# Patient Record
Sex: FEMALE | Race: WHITE
[De-identification: names, ages, dates, MRNs, and addresses within clinical notes are randomized per-mention and may not be internally consistent; named-entity substitution may affect disease eponyms.]

---

## 2017-04-19 ENCOUNTER — HOSPITAL ENCOUNTER (OUTPATIENT)
Dept: HOSPITAL 62 - WI | Age: 52
End: 2017-04-19
Attending: FAMILY MEDICINE
Payer: MEDICARE

## 2017-04-19 DIAGNOSIS — Z12.31: Primary | ICD-10-CM

## 2017-04-19 PROCEDURE — 77067 SCR MAMMO BI INCL CAD: CPT

## 2017-04-19 PROCEDURE — G0202 SCR MAMMO BI INCL CAD: HCPCS

## 2018-01-13 ENCOUNTER — HOSPITAL ENCOUNTER (EMERGENCY)
Dept: HOSPITAL 62 - ER | Age: 53
Discharge: HOME | End: 2018-01-13
Payer: MEDICARE

## 2018-01-13 VITALS — DIASTOLIC BLOOD PRESSURE: 87 MMHG | SYSTOLIC BLOOD PRESSURE: 107 MMHG

## 2018-01-13 DIAGNOSIS — J44.9: ICD-10-CM

## 2018-01-13 DIAGNOSIS — F17.200: ICD-10-CM

## 2018-01-13 DIAGNOSIS — E11.9: ICD-10-CM

## 2018-01-13 DIAGNOSIS — R05: Primary | ICD-10-CM

## 2018-01-13 DIAGNOSIS — Z90.710: ICD-10-CM

## 2018-01-13 DIAGNOSIS — Z90.49: ICD-10-CM

## 2018-01-13 PROCEDURE — 99283 EMERGENCY DEPT VISIT LOW MDM: CPT

## 2018-01-13 NOTE — ER DOCUMENT REPORT
ED General





- General


Chief Complaint: Cough


Stated Complaint: COUGH


Time Seen by Provider: 01/13/18 08:28


TRAVEL OUTSIDE OF THE [place] IN LAST 30 DAYS: No





- HPI


Patient complains to provider of: Cough


Notes: 





Patient coming in for evaluation of cough.  Patient states cough ongoing for 

approximately 1-2 weeks.  Patient does have history of smoking patient patient 

she has been sick she is not smoking patient has guarding on 3 1 Z-Neo and a 

course of steroids patient states she is on her second Z-Neo at this time.  

Denies fevers chills.  Denies any recent travel.  Patient resting comfortably 

upon my evaluation.





- Related Data


Allergies/Adverse Reactions: 


 





amoxicillin [From Augmentin] Allergy (Verified 09/20/16 16:48)


 


clarithromycin [From Biaxin] Allergy (Verified 09/20/16 16:48)


 


clavulanic acid [From Augmentin] Allergy (Verified 09/20/16 16:48)


 


meloxicam [From Mobic] Allergy (Verified 09/20/16 16:48)


 











Past Medical History





- Social History


Smoking Status: Current Every Day Smoker


Chew tobacco use (# tins/day): No


Frequency of alcohol use: None


Drug Abuse: None


Family History: Reviewed & Not Pertinent


Patient has suicidal ideation: No


Patient has homicidal ideation: No


Pulmonary Medical History: Reports: Hx Bronchitis, Hx COPD


Endocrine Medical History: Reports: Hx Diabetes Mellitus Type 2


Renal/ Medical History: Denies: Hx Peritoneal Dialysis


GI Medical History: Reports: Hx Gastroesophageal Reflux Disease


Past Surgical History: Reports: Hx Breast Surgery - breast reduction, Hx 

Cholecystectomy, Hx Genitourinary Surgery - bladder mesh insert and removal, Hx 

Gynecologic Surgery - hysterectomy, Hx Hysterectomy, Hx Nose Surgery - deviated 

septum, polyp removals, Hx Orthopedic Surgery - right shoulder spurs and cyst 

removal, torn rotator cuff, left ulnar nerve, Hx Thyroid Surgery - thyroidectomy





- Immunizations


Immunizations up to date: No


Hx Diphtheria, Pertussis, Tetanus Vaccination: No





Review of Systems





- Review of Systems


Constitutional: No symptoms reported


EENT: No symptoms reported


Cardiovascular: No symptoms reported


Respiratory: Cough


Gastrointestinal: No symptoms reported


Genitourinary: No symptoms reported


Female Genitourinary: No symptoms reported


Musculoskeletal: No symptoms reported


Skin: No symptoms reported


Hematologic/Lymphatic: No symptoms reported


Neurological/Psychological: No symptoms reported


-: Yes All other systems reviewed and negative





Physical Exam





- Vital signs


Vitals: 





 











Temp Pulse Resp BP Pulse Ox


 


 98.3 F   70   16   107/87 H  97 


 


 01/13/18 08:03  01/13/18 08:03  01/13/18 08:03  01/13/18 08:03  01/13/18 08:03











Interpretation: Normal





- General


General appearance: Appears well, Alert





- HEENT


Head: Normocephalic, Atraumatic


Eyes: Normal


Pupils: PERRL





- Respiratory


Respiratory status: No respiratory distress


Chest status: Nontender


Breath sounds: Normal


Chest palpation: Normal





- Cardiovascular


Rhythm: Regular


Heart sounds: Normal auscultation


Murmur: No





- Abdominal


Inspection: Normal


Distension: No distension


Bowel sounds: Normal


Tenderness: Nontender


Organomegaly: No organomegaly





- Back


Back: Normal, Nontender





- Extremities


General upper extremity: Normal inspection, Nontender, Normal color, Normal ROM

, Normal temperature


General lower extremity: Normal inspection, Nontender, Normal color, Normal ROM

, Normal temperature, Normal weight bearing.  No: Ally's sign





- Neurological


Neuro grossly intact: Yes


Cognition: Normal


Orientation: AAOx4


[place] Coma Scale Eye Opening: Spontaneous


[place] Coma Scale Verbal: Oriented


[place] Coma Scale Motor: Obeys Commands


[place] Coma Scale Total: 15


Speech: Normal


Motor strength normal: LUE, RUE, LLE, RLE


Sensory: Normal





- Psychological


Associated symptoms: Normal affect, Normal mood





- Skin


Skin Temperature: Warm


Skin Moisture: Dry


Skin Color: Normal





Course





- Re-evaluation


Re-evalutation: 





01/13/18 14:11


Patient coming in for evaluation of cough.  Lung sounds are clear.  Explained 

the patient the pathophysiology of bronchitis with a long history of cough 

afterwards that the cough will take approximately 4-6 weeks to clear especially 

with the patient have a history of smoking.  Patient will be given Tylenol 3 

for cough suppression.  Educated patient on the use of honey as well.  Patient 

is also reviewed to continue albuterol at home did refill the patient's 

prescription for albuterol she states that her albuterol is out of date at this 

time.  Encouraged patient to stop smoking.  Patient will be discharged home.





- Vital Signs


Vital signs: 





 











Temp Pulse Resp BP Pulse Ox


 


 98.3 F   70   16   107/87 H  97 


 


 01/13/18 08:03  01/13/18 08:03  01/13/18 08:03  01/13/18 08:03  01/13/18 08:03














Discharge





- Discharge


Clinical Impression: 


 Cough





Condition: Good


Disposition: HOME, SELF-CARE


Instructions:  Bronchitis ([facility]), Cough Suppressant & Expectorant Medications, 

Oral Narcotic Medication ([facility]), Stop Smoking ([facility])


Additional Instructions: 


Please continue medications as previously prescribed.  He may use the Tylenol 

with codeine for possible cough suppression.  Also suggest using over-the-

counter medications mixed drinks with honey for cough suppression.  Her lung 

sounds today are clear did not suspect any other infectious etiologies far as 

pneumonia.  When people that smoke are diagnosed with bronchitis you can expect 

a cough to continue for approximately 4-6 weeks.  Stop smoking drink plenty 

water follow-up as needed.


Prescriptions: 


Acetaminophen with Codeine [Tylenol #3 Tablet] 1 each PO Q4HP PRN #20 tablet


 PRN Reason: 


Albuterol Sulfate [Albuterol Sulfate 2.5mg/3 mL] 2.5 mg IH Q4 #30 ml


Forms:  Smoking Cessation Education, Return to Work


Referrals: 


PETE MACIAS MD [Primary Care Provider] - Follow up as needed

## 2018-01-13 NOTE — ER DOCUMENT REPORT
ED General





- General


Chief Complaint: Cough


Stated Complaint: COUGH


Time Seen by Provider: 01/13/18 08:28


TRAVEL OUTSIDE OF THE [place] IN LAST 30 DAYS: No





- Related Data


Allergies/Adverse Reactions: 


 





amoxicillin [From Augmentin] Allergy (Verified 09/20/16 16:48)


 


clarithromycin [From Biaxin] Allergy (Verified 09/20/16 16:48)


 


clavulanic acid [From Augmentin] Allergy (Verified 09/20/16 16:48)


 


meloxicam [From Mobic] Allergy (Verified 09/20/16 16:48)


 











Past Medical History





- Social History


Smoking Status: Current Every Day Smoker


Chew tobacco use (# tins/day): No


Frequency of alcohol use: None


Drug Abuse: None


Family History: Reviewed & Not Pertinent


Patient has suicidal ideation: No


Patient has homicidal ideation: No


Pulmonary Medical History: Reports: Hx Bronchitis, Hx COPD


Endocrine Medical History: Reports: Hx Diabetes Mellitus Type 2


Renal/ Medical History: Denies: Hx Peritoneal Dialysis


GI Medical History: Reports: Hx Gastroesophageal Reflux Disease


Past Surgical History: Reports: Hx Breast Surgery - breast reduction, Hx 

Cholecystectomy, Hx Genitourinary Surgery - bladder mesh insert and removal, Hx 

Gynecologic Surgery - hysterectomy, Hx Hysterectomy, Hx Nose Surgery - deviated 

septum, polyp removals, Hx Orthopedic Surgery - right shoulder spurs and cyst 

removal, torn rotator cuff, left ulnar nerve, Hx Thyroid Surgery - thyroidectomy





- Immunizations


Immunizations up to date: No


Hx Diphtheria, Pertussis, Tetanus Vaccination: No





Physical Exam





- Vital signs


Vitals: 





 











Temp Pulse Resp BP Pulse Ox


 


 98.3 F   70   16   107/87 H  97 


 


 01/13/18 08:03  01/13/18 08:03  01/13/18 08:03  01/13/18 08:03  01/13/18 08:03














Course





- Vital Signs


Vital signs: 





 











Temp Pulse Resp BP Pulse Ox


 


 98.3 F   70   16   107/87 H  97 


 


 01/13/18 08:03  01/13/18 08:03  01/13/18 08:03  01/13/18 08:03  01/13/18 08:03














Discharge





- Discharge


Clinical Impression: 


 Cough





Condition: Good


Disposition: HOME, SELF-CARE


Instructions:  Bronchitis ([facility]), Cough Suppressant & Expectorant Medications, 

Oral Narcotic Medication ([facility]), Stop Smoking ([facility])


Additional Instructions: 


Please continue medications as previously prescribed.  He may use the Tylenol 

with codeine for possible cough suppression.  Also suggest using over-the-

counter medications mixed drinks with honey for cough suppression.  Her lung 

sounds today are clear did not suspect any other infectious etiologies far as 

pneumonia.  When people that smoke are diagnosed with bronchitis you can expect 

a cough to continue for approximately 4-6 weeks.  Stop smoking drink plenty 

water follow-up as needed.


Prescriptions: 


Acetaminophen with Codeine [Tylenol #3 Tablet] 1 each PO Q4HP PRN #20 tablet


 PRN Reason: 


Albuterol Sulfate [Albuterol Sulfate 2.5mg/3 mL] 2.5 mg IH Q4 #30 ml


Forms:  Smoking Cessation Education, Return to Work

## 2019-02-17 ENCOUNTER — HOSPITAL ENCOUNTER (EMERGENCY)
Dept: HOSPITAL 62 - ER | Age: 54
Discharge: HOME | End: 2019-02-17
Payer: MEDICAID

## 2019-02-17 VITALS — SYSTOLIC BLOOD PRESSURE: 133 MMHG | DIASTOLIC BLOOD PRESSURE: 79 MMHG

## 2019-02-17 DIAGNOSIS — Z87.891: ICD-10-CM

## 2019-02-17 DIAGNOSIS — J44.9: ICD-10-CM

## 2019-02-17 DIAGNOSIS — R10.9: Primary | ICD-10-CM

## 2019-02-17 DIAGNOSIS — E11.9: ICD-10-CM

## 2019-02-17 DIAGNOSIS — I10: ICD-10-CM

## 2019-02-17 DIAGNOSIS — M54.9: ICD-10-CM

## 2019-02-17 LAB
ADD MANUAL DIFF: NO
ALBUMIN SERPL-MCNC: 5.5 G/DL (ref 3.5–5)
ALP SERPL-CCNC: 127 U/L (ref 38–126)
ALT SERPL-CCNC: 27 U/L (ref 9–52)
ANION GAP SERPL CALC-SCNC: 16 MMOL/L (ref 5–19)
APPEARANCE UR: (no result)
APTT PPP: YELLOW S
AST SERPL-CCNC: 26 U/L (ref 14–36)
BASOPHILS # BLD AUTO: 0.1 10^3/UL (ref 0–0.2)
BASOPHILS NFR BLD AUTO: 0.6 % (ref 0–2)
BILIRUB DIRECT SERPL-MCNC: 0.3 MG/DL (ref 0–0.4)
BILIRUB SERPL-MCNC: 0.5 MG/DL (ref 0.2–1.3)
BILIRUB UR QL STRIP: NEGATIVE
BUN SERPL-MCNC: 12 MG/DL (ref 7–20)
CALCIUM: 11.1 MG/DL (ref 8.4–10.2)
CHLORIDE SERPL-SCNC: 101 MMOL/L (ref 98–107)
CO2 SERPL-SCNC: 29 MMOL/L (ref 22–30)
EOSINOPHIL # BLD AUTO: 0.3 10^3/UL (ref 0–0.6)
EOSINOPHIL NFR BLD AUTO: 2.8 % (ref 0–6)
ERYTHROCYTE [DISTWIDTH] IN BLOOD BY AUTOMATED COUNT: 14 % (ref 11.5–14)
GLUCOSE SERPL-MCNC: 90 MG/DL (ref 75–110)
GLUCOSE UR STRIP-MCNC: NEGATIVE MG/DL
HCT VFR BLD CALC: 49.3 % (ref 36–47)
HGB BLD-MCNC: 17.1 G/DL (ref 12–15.5)
KETONES UR STRIP-MCNC: NEGATIVE MG/DL
LIPASE SERPL-CCNC: 253.7 U/L (ref 23–300)
LYMPHOCYTES # BLD AUTO: 3.4 10^3/UL (ref 0.5–4.7)
LYMPHOCYTES NFR BLD AUTO: 37.9 % (ref 13–45)
MCH RBC QN AUTO: 30.3 PG (ref 27–33.4)
MCHC RBC AUTO-ENTMCNC: 34.8 G/DL (ref 32–36)
MCV RBC AUTO: 87 FL (ref 80–97)
MONOCYTES # BLD AUTO: 0.7 10^3/UL (ref 0.1–1.4)
MONOCYTES NFR BLD AUTO: 7.4 % (ref 3–13)
NEUTROPHILS # BLD AUTO: 4.7 10^3/UL (ref 1.7–8.2)
NEUTS SEG NFR BLD AUTO: 51.3 % (ref 42–78)
NITRITE UR QL STRIP: NEGATIVE
PH UR STRIP: 6 [PH] (ref 5–9)
PLATELET # BLD: 232 10^3/UL (ref 150–450)
POTASSIUM SERPL-SCNC: 3.9 MMOL/L (ref 3.6–5)
PROT SERPL-MCNC: 8.6 G/DL (ref 6.3–8.2)
PROT UR STRIP-MCNC: NEGATIVE MG/DL
RBC # BLD AUTO: 5.66 10^6/UL (ref 3.72–5.28)
SODIUM SERPL-SCNC: 145.9 MMOL/L (ref 137–145)
SP GR UR STRIP: 1.01
TOTAL CELLS COUNTED % (AUTO): 100 %
UROBILINOGEN UR-MCNC: NEGATIVE MG/DL (ref ?–2)
WBC # BLD AUTO: 9.1 10^3/UL (ref 4–10.5)

## 2019-02-17 PROCEDURE — 83690 ASSAY OF LIPASE: CPT

## 2019-02-17 PROCEDURE — 99284 EMERGENCY DEPT VISIT MOD MDM: CPT

## 2019-02-17 PROCEDURE — 96365 THER/PROPH/DIAG IV INF INIT: CPT

## 2019-02-17 PROCEDURE — 81001 URINALYSIS AUTO W/SCOPE: CPT

## 2019-02-17 PROCEDURE — 74177 CT ABD & PELVIS W/CONTRAST: CPT

## 2019-02-17 PROCEDURE — 96376 TX/PRO/DX INJ SAME DRUG ADON: CPT

## 2019-02-17 PROCEDURE — 96361 HYDRATE IV INFUSION ADD-ON: CPT

## 2019-02-17 PROCEDURE — 85025 COMPLETE CBC W/AUTO DIFF WBC: CPT

## 2019-02-17 PROCEDURE — 80053 COMPREHEN METABOLIC PANEL: CPT

## 2019-02-17 PROCEDURE — S0164 INJECTION PANTROPRAZOLE: HCPCS

## 2019-02-17 PROCEDURE — 96366 THER/PROPH/DIAG IV INF ADDON: CPT

## 2019-02-17 PROCEDURE — 96375 TX/PRO/DX INJ NEW DRUG ADDON: CPT

## 2019-02-17 PROCEDURE — 36415 COLL VENOUS BLD VENIPUNCTURE: CPT

## 2019-02-17 NOTE — RADIOLOGY REPORT (SQ)
EXAM DESCRIPTION:  CT ABD/PELVIS WITH IV   ORAL



COMPLETED DATE/TIME:  2/17/2019 2:01 pm



REASON FOR STUDY:  abd pain .  Diffuse abdominal pain.  Prior cholecystectomy and hysterectomy.



COMPARISON:  None.



TECHNIQUE:  CT scan of the abdomen and pelvis performed using helical scanning technique with dynamic
 intravenous contrast injection and with oral contrast. Images reviewed with lung, soft tissue, and b
one windows. Reconstructed coronal and sagittal MPR images reviewed. Delayed images for evaluation of
 the urinary system also acquired. All images stored on PACS.

All CT scanners at this facility use dose modulation, iterative reconstruction, and/or weight based d
osing when appropriate to reduce radiation dose to as low as reasonably achievable (ALARA).

[facility]: Dose Right  [facility]: CareDose    [facility]: Dose Right    [facility]: Teradose 4D    [facility]: Smart Technologies



CONTRAST TYPE AND DOSE:  contrast/concentration: Isovue 350.00 mg/ml; Total Contrast Delivered: 93.0 
ml; Total Saline Delivered: 71.0 ml



RENAL FUNCTION:  Creatinine 0.81



RADIATION DOSE:  CT Rad equipment meets quality standard of care and radiation dose reduction techniq
ues were employed. CTDIvol: 10.7 - 14.9 mGy. DLP: 1455 mGy-cm..



LIMITATIONS:  None.



FINDINGS:  LOWER CHEST: No consolidation or pleural effusion.

LIVER: Enlarged measuring 23.3 cm in craniocaudal diameter.  There is diffuse decreased attenuation, 
most consistent with fatty infiltration.  No dilated ducts.

SPLEEN: Normal size.

PANCREAS: No significant calcifications. No adjacent inflammation or peripancreatic fluid collections
. Pancreatic duct not dilated.

GALLBLADDER: Surgically absent.

ADRENAL GLANDS: No significant masses or asymmetry.

RIGHT KIDNEY AND URETER: No solid masses.   No significant calcifications.   No hydronephrosis or hyd
roureter.

LEFT KIDNEY AND URETER: No solid masses.   No significant calcifications.   No hydronephrosis or hydr
oureter.

AORTA AND VESSELS: Atherosclerotic calcifications in the abdominal aorta and its branches.  No abdomi
nal aortic aneurysm.

RETROPERITONEUM: No retroperitoneal adenopathy, hemorrhage or masses.

BOWEL AND PERITONEAL CAVITY: No dilated bowel loops or inflammatory changes. No free fluid or free ai
r.

APPENDIX: Normal.

PELVIS: Partially obscured by streak artifact from the left hip prosthesis.  The urinary bladder is p
artially distended.  The uterus is surgically absent.  No free fluid.

ABDOMINAL WALL: Status post ventral hernia repair with mesh placement.

BONES: The patient is status post total left hip arthroplasty.  Multilevel degenerative changes are s
een at the spine.



IMPRESSION:  1. No acute findings in the abdomen or pelvis.

2. Hepatomegaly.  Fatty infiltration of the liver.



TECHNICAL DOCUMENTATION:  JOB ID:  [id number]

OH-[id number]

Quality ID # 436: Final reports with documentation of one or more dose reduction techniques (e.g., Au
tomated exposure control, adjustment of the mA and/or kV according to patient size, use of iterative 
reconstruction technique)

 2011 [employer]- All Rights Reserved



Reading location - IP/workstation name: [email or web address]

## 2019-02-17 NOTE — ER DOCUMENT REPORT
ED General





- General


Chief Complaint: Abdominal Pain


Stated Complaint: BACK PAIN


Time Seen by Provider: 02/17/19 10:02


Primary Care Provider: 


PETE MACIAS MD [NO LOCAL MD] - Follow up as needed


Mode of Arrival: Ambulatory


Information source: Patient


Notes: 





53-year-old female with a history of diabetes, hypertension, GERD, 

diverticulosis who presents to the emergency room with abdominal pain, bloating,

discomfort for the past week.  She denies constipation.  She was placed on 

Augmentin by her primary care doctor 3 days ago.  She has had persistent 

symptoms.  She denies any blood in the stool.  She denies any fever.


TRAVEL OUTSIDE OF THE [place] IN LAST 30 DAYS: No





- HPI


Onset: Last week


Onset/Duration: Gradual


Quality of pain: Achy, Dull


Severity: Mild


Pain Level: 1


Associated symptoms: denies: Chest pain, Fever, Shortness of breath


Exacerbated by: Denies


Relieved by: Denies


Similar symptoms previously: Yes


Recently seen / treated by doctor: Yes





- Related Data


Allergies/Adverse Reactions: 


                                        





clarithromycin [From Biaxin] Allergy (Verified 02/17/19 09:47)


   


clavulanic acid [From Augmentin] Allergy (Verified 02/17/19 09:47)


   


meloxicam [From Mobic] Allergy (Verified 02/17/19 09:47)


   


adhesive tape Adverse Reaction (Intermediate, Verified 02/17/19 10:01)


   


amoxicillin [From Augmentin] Adverse Reaction (Verified 02/17/19 10:42)


   











Past Medical History





- General


Information source: Patient





- Social History


Smoking Status: Former Smoker


Cigarette use (# per day): No


Chew tobacco use (# tins/day): No


Frequency of alcohol use: None


Drug Abuse: None


Family History: Reviewed & Not Pertinent


Patient has suicidal ideation: No


Patient has homicidal ideation: No


Pulmonary Medical History: Reports: Hx Bronchitis, Hx COPD


Endocrine Medical History: Reports: Hx Diabetes Mellitus Type 2


Renal/ Medical History: Denies: Hx Peritoneal Dialysis


GI Medical History: Reports: Hx Gastroesophageal Reflux Disease


Past Surgical History: Reports: Hx Breast Surgery - breast reduction, Hx 

Cholecystectomy, Hx Genitourinary Surgery - bladder mesh insert and removal, Hx 

Gynecologic Surgery - hysterectomy, Hx Hysterectomy, Hx Nose Surgery - deviated 

septum, polyp removals, Hx Orthopedic Surgery - right shoulder spurs and cyst 

removal, torn rotator cuff, left ulnar nerve, Hx Thyroid Surgery - thyroidectomy





- Immunizations


Immunizations up to date: No


Hx Diphtheria, Pertussis, Tetanus Vaccination: No





Review of Systems





- Review of Systems


Constitutional: denies: Chills, Fever


EENT: No symptoms reported


Cardiovascular: No symptoms reported


Respiratory: No symptoms reported


Gastrointestinal: See HPI


Genitourinary: No symptoms reported


Female Genitourinary: No symptoms reported


Musculoskeletal: No symptoms reported


Skin: No symptoms reported


Hematologic/Lymphatic: No symptoms reported


Neurological/Psychological: No symptoms reported





Physical Exam





- Vital signs


Vitals: 


                                        











Temp Pulse Resp BP Pulse Ox


 


 97.7 F   86   18   140/81 H  98 


 


 02/17/19 09:54  02/17/19 09:54  02/17/19 09:54  02/17/19 09:54  02/17/19 09:54











Notes: 





Physical exam:


 


GENERAL: Patient is alert and oriented x3,  c/o abdominal discomfort





HEAD: Atraumatic, normocephalic.





EYES: Pupils equal round and reactive to light, extraocular movements intact, 

sclera anicteric, conjunctiva are normal.





ENT: TMs normal, nares patent, oropharynx clear without exudates.  Moist mucous 

membranes.





NECK: Normal range of motion, supple without obvious mass or JVD.





LUNGS: Breath sounds clear to auscultation bilaterally and equal.  No wheezes 

rales or rhonchi.





HEART: Regular rate and rhythm without murmurs, rubs or gallops.





ABDOMEN: Soft, normoactive bowel sounds.  Mild tenderness diffusely but seems to

be more in the left lower quadrant no guarding, no rebound.  No masses appre

ciated.





EXTREMITIES: Normal range of motion, no pitting or edema.  No clubbing or 

cyanosis.





NEUROLOGICAL: Cranial nerves II through XII grossly intact.  Normal speech, 

moving all extremities.





PSYCH: Normal mood, normal affect.





SKIN: Warm, Dry, normal turgor, no rashes or lesions noted.





Course





- Re-evaluation


Re-evalutation: 





02/17/19 19:02


Note: The CT of the abdomen shows no acute process.


On reassessment of the patient, she does state that she feels bloated and gassy.

 She is drinking a soft drink at this time.  I have advised her to avoid 

carbonated drinks for the next week or 2 and then try and cut down on the 

smoking.  I have also recommended that she continue current medicines.  She is 

on a GI prophylaxis medicine.  I will give her some medicine for abdominal 

cramping.  Given that her primary care doctor put her on Augmentin, I have 

advised her to continue this.  I have advised her to follow-up with her primary 

care doctor on Monday.  I have given her a copy of today's lab tests and CT 

results.





- Vital Signs


Vital signs: 


                                        











Temp Pulse Resp BP Pulse Ox


 


 97.8 F   71   18   133/79 H  96 


 


 02/17/19 16:10  02/17/19 16:10  02/17/19 16:10  02/17/19 16:10  02/17/19 16:10














- Laboratory


Result Diagrams: 


                                 02/17/19 10:24





                                 02/17/19 10:24


Laboratory results interpreted by me: 


                                        











  02/17/19 02/17/19 02/17/19





  10:20 10:24 10:24


 


RBC   5.66 H 


 


Hgb   17.1 H 


 


Hct   49.3 H 


 


Sodium    145.9 H


 


Calcium    11.1 H


 


Alkaline Phosphatase    127 H


 


Total Protein    8.6 H


 


Albumin    5.5 H


 


Urine Blood  SMALL H  














- Diagnostic Test


Radiology reviewed: Image reviewed, Reports reviewed - T of the abdomen shows no

acute intra-abdominal process





Discharge





- Discharge


Clinical Impression: 


 Abdominal pain





Condition: Stable


Disposition: HOME, SELF-CARE


Additional Instructions: 


As we discussed, the CAT scan did not show any acute intra-abdominal pathology.


Your labs did look good.


I would take it easy over the next few days.


Avoid carbonated drinks.


Take the medicine as prescribed.


Continue with the Augmentin as previously prescribed.


Follow-up with your doctor in the office tomorrow: Bring a copy of today's lab 

tests and CT with you.


Return to the emergency room for worsening abdominal pain.


Prescriptions: 


Dicyclomine HCl [Bentyl 10 mg Capsule] 1 cap PO TID #30 cap


Referrals: 


PETE MACIAS MD [NO LOCAL MD] - Follow up as needed

## 2019-02-17 NOTE — ER DOCUMENT REPORT
Entered by HUY GREEN SCRIBE  02/17/19 1013 





Acting as scribe for:ARNOLD LI DO





ED Medical Screen (RME)





- General


Chief Complaint: Abdominal Pain


Stated Complaint: BACK PAIN


Time Seen by Provider: 02/17/19 10:02


Primary Care Provider: 


PETE MACIAS MD [Primary Care Provider] - Follow up as needed


Mode of Arrival: Ambulatory


Information source: Patient


Notes: 


Patient is a 53 year old female with diverticulosis presents to the emergency 

department complaining of abdominal pain onset 1 week ago.  Patient states she 

took citrucel in attempt to alleive her pain but states it did not help.  She 

states the pain is exacerbated with eating and also complains of abdominal 

bloating, nausea and burping. She denies any vomiting, diarrhea, fevers or a 

history of diverticulitis.  She does have a history of diverticulosis, she is 

afraid she is having diverticulitis at this time.





[facility] 

and was prescribed Augmentin.





I have greeted and performed a rapid initial assessment of this patient. A 

comprehensive ED assessment and evaluation of the patient, analysis of test 

results and completion of the medical decision making process will be conducted 

by additional ED providers.





GENERAL: Alert, interacts well. No acute distress.


HEAD: Normocephalic, Atraumatic. 


EYES: Pupils equal, round, and reactive to light. EOMI.


ENT: Oral mucosa moist, tongue midline.


NECK: Full range of motion. Supple. Trachea midline. 


LUNGS:  No respiratory distress.


ABDOMEN: Soft, diffusely tender to palpation, more tender to the epigastrium. 

Non-distended. 


EXTREMITIES: Moves all four extremities spontaneously. 


PSYCH: Normal affect, normal mood. 





TRAVEL OUTSIDE OF THE [place] IN LAST 30 DAYS: No





- Related Data


Allergies/Adverse Reactions: 


                                        





amoxicillin [From Augmentin] Allergy (Verified 02/17/19 09:47)


   


clarithromycin [From Biaxin] Allergy (Verified 02/17/19 09:47)


   


clavulanic acid [From Augmentin] Allergy (Verified 02/17/19 09:47)


   


meloxicam [From Mobic] Allergy (Verified 02/17/19 09:47)


   


adhesive tape Adverse Reaction (Intermediate, Verified 02/17/19 10:01)


   











Past Medical History


Pulmonary Medical History: Reports: Hx Bronchitis, Hx COPD


Endocrine Medical History: Reports: Hx Diabetes Mellitus Type 2


Renal/ Medical History: Denies: Hx Peritoneal Dialysis


GI Medical History: Reports: Hx Gastroesophageal Reflux Disease


Past Surgical History: Reports: Hx Breast Surgery - breast reduction, Hx 

Cholecystectomy, Hx Genitourinary Surgery - bladder mesh insert and removal, Hx 

Gynecologic Surgery - hysterectomy, Hx Hysterectomy, Hx Nose Surgery - deviated 

septum, polyp removals, Hx Orthopedic Surgery - right shoulder spurs and cyst 

removal, torn rotator cuff, left ulnar nerve, Hx Thyroid Surgery - thyroidectomy





- Immunizations


Immunizations up to date: No


Hx Diphtheria, Pertussis, Tetanus Vaccination: No





Physical Exam





- Vital signs


Vitals: 


                                        











Temp Pulse Resp BP Pulse Ox


 


 97.7 F   86   18   140/81 H  98 


 


 02/17/19 09:54  02/17/19 09:54  02/17/19 09:54  02/17/19 09:54  02/17/19 09:54














Course





- Vital Signs


Vital signs: 


                                        











Temp Pulse Resp BP Pulse Ox


 


 97.7 F   86   18   140/81 H  98 


 


 02/17/19 09:54  02/17/19 09:54  02/17/19 09:54  02/17/19 09:54  02/17/19 09:54














Doctor's Discharge





- Discharge


Referrals: 


PETE MACIAS MD [Primary Care Provider] - Follow up as needed





I personally performed the services described in the documentation, reviewed and

edited the documentation which was dictated to the scribe in my presence, and it

accurately records my words and actions.

## 2019-08-28 ENCOUNTER — HOSPITAL ENCOUNTER (EMERGENCY)
Dept: HOSPITAL 62 - ER | Age: 54
LOS: 1 days | Discharge: LEFT BEFORE DISCHARGE | End: 2019-08-29
Payer: MEDICARE

## 2019-08-28 VITALS — SYSTOLIC BLOOD PRESSURE: 147 MMHG | DIASTOLIC BLOOD PRESSURE: 91 MMHG

## 2019-08-28 DIAGNOSIS — R50.9: ICD-10-CM

## 2019-08-28 DIAGNOSIS — Z53.21: Primary | ICD-10-CM

## 2019-08-28 PROCEDURE — 99281 EMR DPT VST MAYX REQ PHY/QHP: CPT

## 2019-08-29 NOTE — ER DOCUMENT REPORT
Doctor's Note


Notes: 


I have attempted to go see the patient and was told by nursing staff patient has

left the emergency department.  I did not see the patient to evaluate them.

## 2019-10-04 ENCOUNTER — HOSPITAL ENCOUNTER (EMERGENCY)
Dept: HOSPITAL 62 - ER | Age: 54
Discharge: HOME | End: 2019-10-04
Payer: MEDICARE

## 2019-10-04 VITALS — SYSTOLIC BLOOD PRESSURE: 140 MMHG | DIASTOLIC BLOOD PRESSURE: 69 MMHG

## 2019-10-04 DIAGNOSIS — R53.1: ICD-10-CM

## 2019-10-04 DIAGNOSIS — J44.9: ICD-10-CM

## 2019-10-04 DIAGNOSIS — R50.9: ICD-10-CM

## 2019-10-04 DIAGNOSIS — F17.200: ICD-10-CM

## 2019-10-04 DIAGNOSIS — R53.83: Primary | ICD-10-CM

## 2019-10-04 DIAGNOSIS — R03.0: ICD-10-CM

## 2019-10-04 DIAGNOSIS — E86.0: ICD-10-CM

## 2019-10-04 DIAGNOSIS — R11.0: ICD-10-CM

## 2019-10-04 DIAGNOSIS — E11.9: ICD-10-CM

## 2019-10-04 LAB
ADD MANUAL DIFF: NO
ALBUMIN SERPL-MCNC: 5.1 G/DL (ref 3.5–5)
ALP SERPL-CCNC: 111 U/L (ref 38–126)
ANION GAP SERPL CALC-SCNC: 10 MMOL/L (ref 5–19)
APPEARANCE UR: CLEAR
APTT PPP: (no result) S
AST SERPL-CCNC: 37 U/L (ref 14–36)
BASOPHILS # BLD AUTO: 0.1 10^3/UL (ref 0–0.2)
BASOPHILS NFR BLD AUTO: 0.6 % (ref 0–2)
BILIRUB DIRECT SERPL-MCNC: 0.3 MG/DL (ref 0–0.4)
BILIRUB SERPL-MCNC: 0.5 MG/DL (ref 0.2–1.3)
BILIRUB UR QL STRIP: NEGATIVE
BUN SERPL-MCNC: 12 MG/DL (ref 7–20)
CALCIUM: 10.6 MG/DL (ref 8.4–10.2)
CHLORIDE SERPL-SCNC: 105 MMOL/L (ref 98–107)
CO2 SERPL-SCNC: 28 MMOL/L (ref 22–30)
EOSINOPHIL # BLD AUTO: 0.2 10^3/UL (ref 0–0.6)
EOSINOPHIL NFR BLD AUTO: 2 % (ref 0–6)
ERYTHROCYTE [DISTWIDTH] IN BLOOD BY AUTOMATED COUNT: 13.9 % (ref 11.5–14)
GLUCOSE SERPL-MCNC: 87 MG/DL (ref 75–110)
GLUCOSE UR STRIP-MCNC: NEGATIVE MG/DL
HCT VFR BLD CALC: 45.8 % (ref 36–47)
HGB BLD-MCNC: 15.6 G/DL (ref 12–15.5)
KETONES UR STRIP-MCNC: NEGATIVE MG/DL
LYMPHOCYTES # BLD AUTO: 3.5 10^3/UL (ref 0.5–4.7)
LYMPHOCYTES NFR BLD AUTO: 37.9 % (ref 13–45)
MCH RBC QN AUTO: 30.1 PG (ref 27–33.4)
MCHC RBC AUTO-ENTMCNC: 34.1 G/DL (ref 32–36)
MCV RBC AUTO: 89 FL (ref 80–97)
MONOCYTES # BLD AUTO: 0.8 10^3/UL (ref 0.1–1.4)
MONOCYTES NFR BLD AUTO: 8.7 % (ref 3–13)
NEUTROPHILS # BLD AUTO: 4.7 10^3/UL (ref 1.7–8.2)
NEUTS SEG NFR BLD AUTO: 50.8 % (ref 42–78)
NITRITE UR QL STRIP: NEGATIVE
PH UR STRIP: 6 [PH] (ref 5–9)
PLATELET # BLD: 191 10^3/UL (ref 150–450)
POTASSIUM SERPL-SCNC: 4.7 MMOL/L (ref 3.6–5)
PROT SERPL-MCNC: 7.9 G/DL (ref 6.3–8.2)
PROT UR STRIP-MCNC: NEGATIVE MG/DL
RBC # BLD AUTO: 5.17 10^6/UL (ref 3.72–5.28)
SP GR UR STRIP: 1
TOTAL CELLS COUNTED % (AUTO): 100 %
UROBILINOGEN UR-MCNC: NEGATIVE MG/DL (ref ?–2)
WBC # BLD AUTO: 9.3 10^3/UL (ref 4–10.5)

## 2019-10-04 PROCEDURE — 83690 ASSAY OF LIPASE: CPT

## 2019-10-04 PROCEDURE — 80053 COMPREHEN METABOLIC PANEL: CPT

## 2019-10-04 PROCEDURE — 81001 URINALYSIS AUTO W/SCOPE: CPT

## 2019-10-04 PROCEDURE — 99283 EMERGENCY DEPT VISIT LOW MDM: CPT

## 2019-10-04 PROCEDURE — 36415 COLL VENOUS BLD VENIPUNCTURE: CPT

## 2019-10-04 PROCEDURE — 85025 COMPLETE CBC W/AUTO DIFF WBC: CPT

## 2019-10-04 NOTE — ER DOCUMENT REPORT
ED General





- General


Chief Complaint: Nausea


Stated Complaint: FEVER, SICK


Time Seen by Provider: 10/04/19 11:11


Primary Care Provider: 


KAM ROSE PA-C [Primary Care Provider] - Follow up as needed


Mode of Arrival: Ambulatory


TRAVEL OUTSIDE OF THE [place] IN LAST 30 DAYS: No





- HPI


Patient complains to provider of: weakness


Notes: 





53 y/o presenting to ED for evaluation of generalized weakness and fatigue 

ongoing for about 2-3 months


she is currently on augment for sinus infection and notes this has not helped 

her at all


she is on synthroid as she has no thyroid and is unsure if her thyroid has been 

checked


she denies fever, chills, vomiting, diarrhea, rashes, cough


she has had congestion and sinus pain but again is on augment





- Related Data


Allergies/Adverse Reactions: 


                                        





clarithromycin [From Biaxin] Allergy (Verified 10/04/19 11:12)


   


meloxicam [From Mobic] Allergy (Verified 10/04/19 11:12)


   


adhesive tape Adverse Reaction (Intermediate, Verified 10/04/19 11:12)


   











Past Medical History





- General


Information source: Patient





- Social History


Smoking Status: Current Every Day Smoker


Chew tobacco use (# tins/day): No


Frequency of alcohol use: None


Drug Abuse: None


Family History: Reviewed & Not Pertinent


Patient has suicidal ideation: No


Patient has homicidal ideation: No


Pulmonary Medical History: Reports: Hx Bronchitis, Hx COPD


Endocrine Medical History: Reports: Hx Diabetes Mellitus Type 2


Renal/ Medical History: Denies: Hx Peritoneal Dialysis


GI Medical History: Reports: Hx Gastroesophageal Reflux Disease


Past Surgical History: Reports: Hx Breast Surgery - breast reduction, Hx Ch

olecystectomy, Hx Genitourinary Surgery - bladder mesh insert and removal, Hx 

Gynecologic Surgery - hysterectomy, Hx Hysterectomy, Hx Nose Surgery - deviated 

septum, polyp removals, Hx Orthopedic Surgery - right shoulder spurs and cyst 

removal, torn rotator cuff, left ulnar nerve, Hx Thyroid Surgery - thyroidectomy





- Immunizations


Immunizations up to date: No


Hx Diphtheria, Pertussis, Tetanus Vaccination: No





Review of Systems





- Review of Systems


Constitutional: Malaise, Weakness


EENT: No symptoms reported


Cardiovascular: No symptoms reported


Respiratory: No symptoms reported


Gastrointestinal: No symptoms reported


Genitourinary: No symptoms reported


Female Genitourinary: No symptoms reported


Musculoskeletal: No symptoms reported


Skin: No symptoms reported


Hematologic/Lymphatic: No symptoms reported


Neurological/Psychological: No symptoms reported





Physical Exam





- Vital signs


Vitals: 


                                        











Temp Pulse Resp BP Pulse Ox


 


 98.0 F   65   18   140/64 H  97 


 


 10/04/19 11:08  10/04/19 11:08  10/04/19 11:08  10/04/19 11:08  10/04/19 11:08











Interpretation: Normal





- General


General appearance: Appears well, Alert





- HEENT


Head: Normocephalic, Atraumatic


Eyes: Normal


Pupils: PERRL


Mucous membranes: Dry


Pharynx: Normal


Neck: Normal.  No: Anterior cervical chain, Posterior cervical chain, 

Lymphadenopathy





- Respiratory


Respiratory status: No respiratory distress


Chest status: Nontender


Breath sounds: Normal


Chest palpation: Normal





- Cardiovascular


Rhythm: Regular


Heart sounds: Normal auscultation


Murmur: No





- Abdominal


Inspection: Normal


Distension: No distension


Bowel sounds: Normal


Tenderness: Nontender


Organomegaly: No organomegaly





- Back


Back: Normal, Nontender





- Extremities


General upper extremity: Normal inspection, Nontender, Normal color, Normal ROM,

Normal temperature


General lower extremity: Normal inspection, Nontender, Normal color, Normal ROM,

Normal temperature, Normal weight bearing.  No: Ally's sign





- Neurological


Neuro grossly intact: Yes


Cognition: Normal


Orientation: AAOx4


Machelle Coma Scale Eye Opening: Spontaneous


Machelle Coma Scale Verbal: Oriented


Machelle Coma Scale Motor: Obeys Commands


Lockridge Coma Scale Total: 15


Speech: Normal


Motor strength normal: LUE, RUE, LLE, RLE


Sensory: Normal





- Psychological


Associated symptoms: Normal affect, Normal mood





- Skin


Skin Temperature: Warm


Skin Moisture: Dry


Skin Color: Normal





Course





- Re-evaluation


Re-evalutation: 





10/04/19 13:44


nonspecific history w/o focal exam findings


labs reassuring


recommend outpt follow up with pcp to check thyroid function


otherwise safe for dc from an ED standpoint





- Vital Signs


Vital signs: 


                                        











Temp Pulse Resp BP Pulse Ox


 


 98.4 F   65   12   140/69 H  96 


 


 10/04/19 13:01  10/04/19 11:08  10/04/19 13:01  10/04/19 13:01  10/04/19 13:01














- Laboratory


Result Diagrams: 


                                 10/04/19 11:27





                                 10/04/19 11:27


Laboratory results interpreted by me: 


                                        











  10/04/19 10/04/19 10/04/19





  11:27 11:27 11:27


 


Hgb  15.6 H  


 


Calcium   10.6 H 


 


AST   37 H 


 


Albumin   5.1 H 


 


Urine Blood    SMALL H














Discharge





- Discharge


Clinical Impression: 


 Elevated blood pressure reading, Dehydration





Fatigue


Qualifiers:


 Fatigue type: unspecified Qualified Code(s): R53.83 - Other fatigue





Condition: Stable


Disposition: HOME, SELF-CARE


Instructions:  Dehydration ([facility]), Fatigue ([facility])


Additional Instructions: 


please follow up with a primary doctor for follow up of your symptoms - request 

that your thyroid function be tested


return to the ED with worsening


continue your augmentin to completion and all of your home medications as 

prescribed


Forms:  Elevated Blood Pressure


Referrals: 


KAM ROSE PA-C [Primary Care Provider] - Follow up as needed

## 2019-10-04 NOTE — ER DOCUMENT REPORT
ED Medical Screen (RME)





- General


Stated Complaint: FEVER, SICK


Time Seen by Provider: 10/04/19 11:11


Primary Care Provider: 


KAM ROSE PA-C [Primary Care Provider] - Follow up as needed


Mode of Arrival: Ambulatory


Information source: Patient


Notes: 





4-year-old female presented to ED for complaint does not feel good and she is 

been doing this since August.  She states her temp is been running 98.8-99 and 

her temp is usually 97.  She states she has been nauseated not vomiting.  She 

does not have significant abdominal pain she just does not feel good anything 

that was going on.  She states she smokes 1/2 pack to a pack a day she states 

she does not drink or use any illicit drugs.  She states she is disabled and 

lives with family.  She states when she was in [place] a couple weeks ago her 

dentist it is a separate lives.  She states her disability is for back problems.

 An enlarged heart heart valve regurgitation and a left hip replacement.  

Patient is alert and oriented respirations regular and unlabored at this time.











I have greeted and performed a rapid initial assessment of this patient.  A 

comprehensive ED assessment and evaluation of the patient, analysis of test resu

lts and completion of medical decision making process will be conducted by an 

additional ED providers.


TRAVEL OUTSIDE OF THE [place] IN LAST 30 DAYS: No





- Related Data


Allergies/Adverse Reactions: 


                                        





clarithromycin [From Biaxin] Allergy (Verified 02/17/19 09:47)


   


clavulanic acid [From Augmentin] Allergy (Verified 02/17/19 09:47)


   


meloxicam [From Mobic] Allergy (Verified 02/17/19 09:47)


   


adhesive tape Adverse Reaction (Intermediate, Verified 02/17/19 10:01)


   


amoxicillin [From Augmentin] Adverse Reaction (Verified 02/17/19 10:42)


   











Past Medical History


Pulmonary Medical History: Reports: Hx Bronchitis, Hx COPD


Endocrine Medical History: Reports: Hx Diabetes Mellitus Type 2


Renal/ Medical History: Denies: Hx Peritoneal Dialysis


GI Medical History: Reports: Hx Gastroesophageal Reflux Disease


Past Surgical History: Reports: Hx Breast Surgery - breast reduction, Hx 

Cholecystectomy, Hx Genitourinary Surgery - bladder mesh insert and removal, Hx 

Gynecologic Surgery - hysterectomy, Hx Hysterectomy, Hx Nose Surgery - deviated 

septum, polyp removals, Hx Orthopedic Surgery - right shoulder spurs and cyst 

removal, torn rotator cuff, left ulnar nerve, Hx Thyroid Surgery - thyroidectomy





- Immunizations


Immunizations up to date: No


Hx Diphtheria, Pertussis, Tetanus Vaccination: No





Physical Exam





- Vital signs


Vitals: 





                                        











Temp Pulse Resp BP Pulse Ox


 


 98.0 F   65   18   140/64 H  97 


 


 10/04/19 11:08  10/04/19 11:08  10/04/19 11:08  10/04/19 11:08  10/04/19 11:08














Course





- Vital Signs


Vital signs: 





                                        











Temp Pulse Resp BP Pulse Ox


 


 98.0 F   65   18   140/64 H  97 


 


 10/04/19 11:08  10/04/19 11:08  10/04/19 11:08  10/04/19 11:08  10/04/19 11:08














Doctor's Discharge





- Discharge


Referrals: 


KAM ROSE PA-C [Primary Care Provider] - Follow up as needed

## 2019-10-04 NOTE — ER DOCUMENT REPORT
Doctor's Note


Notes: 





10/04/19 13:48


patient was offered IVF but apparently eloped prior to receiving them and 

discharge counseling.